# Patient Record
Sex: MALE | Race: WHITE | NOT HISPANIC OR LATINO
[De-identification: names, ages, dates, MRNs, and addresses within clinical notes are randomized per-mention and may not be internally consistent; named-entity substitution may affect disease eponyms.]

---

## 2017-10-19 ENCOUNTER — APPOINTMENT (OUTPATIENT)
Dept: SURGERY | Facility: CLINIC | Age: 57
End: 2017-10-19
Payer: COMMERCIAL

## 2017-10-19 VITALS
OXYGEN SATURATION: 95 % | WEIGHT: 220 LBS | SYSTOLIC BLOOD PRESSURE: 120 MMHG | DIASTOLIC BLOOD PRESSURE: 85 MMHG | HEART RATE: 78 BPM | HEIGHT: 74 IN | BODY MASS INDEX: 28.23 KG/M2

## 2017-10-19 DIAGNOSIS — Z87.2 PERSONAL HISTORY OF DISEASES OF THE SKIN AND SUBCUTANEOUS TISSUE: ICD-10-CM

## 2017-10-19 DIAGNOSIS — Z85.820 PERSONAL HISTORY OF MALIGNANT MELANOMA OF SKIN: ICD-10-CM

## 2017-10-19 DIAGNOSIS — Z82.49 FAMILY HISTORY OF ISCHEMIC HEART DISEASE AND OTHER DISEASES OF THE CIRCULATORY SYSTEM: ICD-10-CM

## 2017-10-19 PROBLEM — Z00.00 ENCOUNTER FOR PREVENTIVE HEALTH EXAMINATION: Status: ACTIVE | Noted: 2017-10-19

## 2017-10-19 PROCEDURE — 99242 OFF/OP CONSLTJ NEW/EST SF 20: CPT

## 2017-10-30 ENCOUNTER — OUTPATIENT (OUTPATIENT)
Dept: OUTPATIENT SERVICES | Facility: HOSPITAL | Age: 57
LOS: 1 days | Discharge: HOME | End: 2017-10-30

## 2017-10-30 DIAGNOSIS — Z01.818 ENCOUNTER FOR OTHER PREPROCEDURAL EXAMINATION: ICD-10-CM

## 2017-10-30 DIAGNOSIS — D48.1 NEOPLASM OF UNCERTAIN BEHAVIOR OF CONNECTIVE AND OTHER SOFT TISSUE: ICD-10-CM

## 2017-11-13 ENCOUNTER — OUTPATIENT (OUTPATIENT)
Dept: OUTPATIENT SERVICES | Facility: HOSPITAL | Age: 57
LOS: 1 days | Discharge: HOME | End: 2017-11-13

## 2017-11-16 DIAGNOSIS — E78.00 PURE HYPERCHOLESTEROLEMIA, UNSPECIFIED: ICD-10-CM

## 2017-11-16 DIAGNOSIS — D17.22 BENIGN LIPOMATOUS NEOPLASM OF SKIN AND SUBCUTANEOUS TISSUE OF LEFT ARM: ICD-10-CM

## 2017-11-16 DIAGNOSIS — R22.32 LOCALIZED SWELLING, MASS AND LUMP, LEFT UPPER LIMB: ICD-10-CM

## 2017-11-21 ENCOUNTER — APPOINTMENT (OUTPATIENT)
Dept: SURGERY | Facility: CLINIC | Age: 57
End: 2017-11-21
Payer: COMMERCIAL

## 2017-11-21 VITALS
WEIGHT: 220 LBS | SYSTOLIC BLOOD PRESSURE: 120 MMHG | HEIGHT: 74 IN | BODY MASS INDEX: 28.23 KG/M2 | DIASTOLIC BLOOD PRESSURE: 80 MMHG

## 2017-11-21 DIAGNOSIS — D48.1 NEOPLASM OF UNCERTAIN BEHAVIOR OF CONNECTIVE AND OTHER SOFT TISSUE: ICD-10-CM

## 2017-11-21 PROCEDURE — 99024 POSTOP FOLLOW-UP VISIT: CPT

## 2018-07-11 ENCOUNTER — TRANSCRIPTION ENCOUNTER (OUTPATIENT)
Age: 58
End: 2018-07-11

## 2021-09-01 ENCOUNTER — APPOINTMENT (OUTPATIENT)
Age: 61
End: 2021-09-01
Payer: COMMERCIAL

## 2021-09-01 VITALS
DIASTOLIC BLOOD PRESSURE: 80 MMHG | HEIGHT: 74 IN | SYSTOLIC BLOOD PRESSURE: 122 MMHG | BODY MASS INDEX: 29.77 KG/M2 | WEIGHT: 232 LBS | RESPIRATION RATE: 14 BRPM | HEART RATE: 84 BPM | OXYGEN SATURATION: 97 %

## 2021-09-01 PROCEDURE — 99213 OFFICE O/P EST LOW 20 MIN: CPT

## 2021-09-01 RX ORDER — SIMVASTATIN 20 MG/1
20 TABLET, FILM COATED ORAL
Refills: 0 | Status: COMPLETED | COMMUNITY
End: 2021-09-01

## 2021-09-01 RX ORDER — DICYCLOMINE HYDROCHLORIDE 10 MG/5ML
10 SOLUTION ORAL
Refills: 0 | Status: COMPLETED | COMMUNITY
End: 2021-09-01

## 2021-09-02 NOTE — PHYSICAL EXAM
[No Acute Distress] : no acute distress [III] : Mallampati Class: III [Normal Appearance] : normal appearance [No Neck Mass] : no neck mass [Normal Rate/Rhythm] : normal rate/rhythm [Normal S1, S2] : normal s1, s2 [No Murmurs] : no murmurs [No Resp Distress] : no resp distress [Clear to Auscultation Bilaterally] : clear to auscultation bilaterally [No Abnormalities] : no abnormalities [No Clubbing] : no clubbing [No Cyanosis] : no cyanosis [No Edema] : no edema

## 2021-09-02 NOTE — HISTORY OF PRESENT ILLNESS
[TextBox_4] : routine f/u-pt is compliant with autopap and deriving benefit-current machine is > 5 yrs old and is on recall

## 2022-04-04 ENCOUNTER — APPOINTMENT (OUTPATIENT)
Age: 62
End: 2022-04-04
Payer: COMMERCIAL

## 2022-04-04 VITALS
HEIGHT: 74 IN | WEIGHT: 236 LBS | BODY MASS INDEX: 30.29 KG/M2 | SYSTOLIC BLOOD PRESSURE: 122 MMHG | OXYGEN SATURATION: 92 % | DIASTOLIC BLOOD PRESSURE: 72 MMHG | RESPIRATION RATE: 14 BRPM | HEART RATE: 96 BPM

## 2022-04-04 PROCEDURE — 99212 OFFICE O/P EST SF 10 MIN: CPT

## 2022-04-04 NOTE — HISTORY OF PRESENT ILLNESS
[Follow-Up - Routine Clinic] : a routine clinic follow-up of [None] : No associated symptoms are reported [Good Compliance] : good compliance with treatment [Good Tolerance] : good tolerance of treatment [Good Symptom Control] : good symptom control [de-identified] : APAP

## 2022-04-04 NOTE — ASSESSMENT
[FreeTextEntry1] : HO severe TANISHA on APAP \par Patient has 2 machinea:\par 1.  A Anisa that runs up to 13 cm H2O and he tolerates very well\par 2.  A ResMed that runs up to 20 and can not tolerate it \par NOt sure if the ResMed is malfunctioning

## 2022-05-05 ENCOUNTER — APPOINTMENT (OUTPATIENT)
Age: 62
End: 2022-05-05
Payer: COMMERCIAL

## 2022-05-05 PROCEDURE — 99441: CPT

## 2022-05-05 NOTE — HISTORY OF PRESENT ILLNESS
[Follow-Up - Routine Clinic] : a routine clinic follow-up of [None] : No associated symptoms are reported [Good Compliance] : good compliance with treatment [Good Tolerance] : good tolerance of treatment [Good Symptom Control] : good symptom control [Home] : at home, [unfilled] , at the time of the visit. [Medical Office: ([facility])___] : at the medical office located in  [Verbal consent obtained from patient] : the patient, [unfilled] [de-identified] : APAP

## 2022-05-05 NOTE — ASSESSMENT
[FreeTextEntry1] : HO severe TANISHA on APAP \par Patient has 2 machinea:\par 1.  A Anisa that runs up to 14 cm H2O and he tolerates very well\par 2.  A ResMed that runs up to 20 and can not tolerate it \par NOt sure if the ResMed is malfunctioning

## 2023-02-23 ENCOUNTER — NON-APPOINTMENT (OUTPATIENT)
Age: 63
End: 2023-02-23

## 2023-03-18 ENCOUNTER — NON-APPOINTMENT (OUTPATIENT)
Age: 63
End: 2023-03-18

## 2023-04-02 ENCOUNTER — NON-APPOINTMENT (OUTPATIENT)
Age: 63
End: 2023-04-02

## 2023-04-03 ENCOUNTER — APPOINTMENT (OUTPATIENT)
Dept: SURGERY | Facility: CLINIC | Age: 63
End: 2023-04-03
Payer: COMMERCIAL

## 2023-04-03 VITALS
BODY MASS INDEX: 30.29 KG/M2 | HEIGHT: 74 IN | HEART RATE: 73 BPM | WEIGHT: 236 LBS | TEMPERATURE: 97 F | DIASTOLIC BLOOD PRESSURE: 82 MMHG | OXYGEN SATURATION: 98 % | SYSTOLIC BLOOD PRESSURE: 138 MMHG

## 2023-04-03 DIAGNOSIS — M62.08 SEPARATION OF MUSCLE (NONTRAUMATIC), OTHER SITE: ICD-10-CM

## 2023-04-03 PROCEDURE — 99202 OFFICE O/P NEW SF 15 MIN: CPT

## 2023-04-04 PROBLEM — M62.08 DIASTASIS RECTI: Status: ACTIVE | Noted: 2023-04-04

## 2023-04-04 NOTE — HISTORY OF PRESENT ILLNESS
[de-identified] :  the patient comes for excision of an inclusion cyst of his anterior chest; this was a small nodule that was present for at least 20 years,  and recently enlarged and became very inflamed about 1 month ago.  It opened and drained spontaneously and after a visit at a local urgent care center he was placed on oral amoxicillin and a topical antibiotic.  He saw  his dermatologist thereafter and comes now for surgical management.  It is currently much smaller and asymptomatic.  There is no history of prior trauma or infection here.

## 2023-04-04 NOTE — ASSESSMENT
[FreeTextEntry1] :   Persistent epidermal inclusion cyst of the anterior chest as noted above.  This can be excised in the office under local anesthesia, or in the ambulatory surgery suite under IV sedation.  He prefers the former and this will be scheduled promptly.  The procedure was explained and a surgical consent was obtained.  The diastases recti was discussed and does not require any surgical intervention.  All his questions were answered.

## 2023-04-04 NOTE — PHYSICAL EXAM
[Normal Thyroid] : the thyroid was normal [Normal Breath Sounds] : Normal breath sounds [Normal Heart Sounds] : normal heart sounds [Normal Rate and Rhythm] : normal rate and rhythm [Abdominal Masses] : No abdominal masses [Abdomen Tenderness] : ~T ~M No abdominal tenderness [No HSM] : no hepatosplenomegaly [de-identified] :  healthy [de-identified] :  no adenopathy [de-identified] :   Large asymptomatic midline diastases recti. [de-identified] :  1 cm firm raised intradermal nodule in the presternal region which is not fluctuant, tender, or draining.  No local cellulitis or lymphangitis.

## 2023-04-26 ENCOUNTER — LABORATORY RESULT (OUTPATIENT)
Age: 63
End: 2023-04-26

## 2023-04-26 ENCOUNTER — APPOINTMENT (OUTPATIENT)
Dept: SURGERY | Facility: CLINIC | Age: 63
End: 2023-04-26
Payer: COMMERCIAL

## 2023-04-26 VITALS
DIASTOLIC BLOOD PRESSURE: 76 MMHG | OXYGEN SATURATION: 98 % | SYSTOLIC BLOOD PRESSURE: 124 MMHG | BODY MASS INDEX: 27.46 KG/M2 | TEMPERATURE: 96.8 F | HEIGHT: 74 IN | HEART RATE: 74 BPM | WEIGHT: 214 LBS

## 2023-04-26 PROCEDURE — 12032 INTMD RPR S/A/T/EXT 2.6-7.5: CPT | Mod: 59

## 2023-04-26 PROCEDURE — 11402 EXC TR-EXT B9+MARG 1.1-2 CM: CPT

## 2023-04-26 NOTE — PROCEDURE
[FreeTextEntry1] :  the patient returns for excision of the small inclusion cyst in the presternal region of his chest.  He notes no new symptoms or changes in this area.\par \par After discussion and obtaining consent, he was positioned appropriately on the procedure table.  The area was prepped and draped in the usual sterile fashion and infiltrated with 3 to 4 cc of 1% Xylocaine with adrenaline.  A 3 cm transverse incision was made, which included an ellipse of overlying skin and the underlying cyst.  This was excised sharply going down to subcutaneous fat and did not involve the underlying fascia or muscle.  The specimen was submitted to pathology in fixative.  The wound was irrigated with saline and hemostasis was assured.  The incision was then closed in layers with interrupted inverting subcutaneous suture of 3-0 Vicryl and running subcuticular suture of 4-0 Vicryl.  Steri-Strips and a dry sterile occlusive dressing were applied.\par \par The patient tolerated the procedure well and left the office in good condition.  Full local care and activity instructions were reviewed and all his questions were answered.  He will use OTC analgesics as needed and return in 2 to 3 weeks for a postop check.

## 2023-05-15 ENCOUNTER — APPOINTMENT (OUTPATIENT)
Dept: SURGERY | Facility: CLINIC | Age: 63
End: 2023-05-15
Payer: COMMERCIAL

## 2023-05-15 VITALS
OXYGEN SATURATION: 98 % | TEMPERATURE: 97.7 F | DIASTOLIC BLOOD PRESSURE: 76 MMHG | HEIGHT: 74 IN | HEART RATE: 78 BPM | SYSTOLIC BLOOD PRESSURE: 114 MMHG | WEIGHT: 210 LBS | BODY MASS INDEX: 26.95 KG/M2

## 2023-05-15 DIAGNOSIS — L72.0 EPIDERMAL CYST: ICD-10-CM

## 2023-05-15 PROCEDURE — 99024 POSTOP FOLLOW-UP VISIT: CPT

## 2023-05-15 NOTE — ASSESSMENT
[FreeTextEntry1] :  no postoperative problems noted.  Local care and activity instructions were reviewed and all his questions were answered.  He will return here as need be.

## 2023-05-15 NOTE — PHYSICAL EXAM
[de-identified] :  small transverse incision in the presternal region is clean and dry, soft and flat, no evidence of hematoma or infection

## 2023-05-15 NOTE — HISTORY OF PRESENT ILLNESS
[de-identified] : First postoperative visit after excision of the anterior chest wall cyst.  He looks and feels well and has no complaints regarding the surgery

## 2023-05-24 ENCOUNTER — APPOINTMENT (OUTPATIENT)
Dept: SURGERY | Facility: CLINIC | Age: 63
End: 2023-05-24

## 2023-08-10 ENCOUNTER — APPOINTMENT (OUTPATIENT)
Dept: PULMONOLOGY | Facility: CLINIC | Age: 63
End: 2023-08-10
Payer: COMMERCIAL

## 2023-08-10 VITALS
DIASTOLIC BLOOD PRESSURE: 80 MMHG | SYSTOLIC BLOOD PRESSURE: 140 MMHG | HEART RATE: 66 BPM | WEIGHT: 212 LBS | OXYGEN SATURATION: 98 % | HEIGHT: 74 IN | RESPIRATION RATE: 12 BRPM | BODY MASS INDEX: 27.21 KG/M2

## 2023-08-10 PROCEDURE — 99213 OFFICE O/P EST LOW 20 MIN: CPT | Mod: 25

## 2023-08-10 PROCEDURE — 71046 X-RAY EXAM CHEST 2 VIEWS: CPT

## 2023-08-10 NOTE — HISTORY OF PRESENT ILLNESS
[Follow-Up - Routine Clinic] : a routine clinic follow-up of [None] : No associated symptoms are reported [Good Compliance] : good compliance with treatment [Good Tolerance] : good tolerance of treatment [Good Symptom Control] : good symptom control [de-identified] : APAP

## 2023-08-10 NOTE — ASSESSMENT
[FreeTextEntry1] : HO severe TANISHA on APAP  Patient has 2 machines.  A [employer] and a [employer].

## 2023-08-10 NOTE — PROCEDURE
[FreeTextEntry1] : CXR PA and Lateral  The costophrenic and cardiophrenic angles are sharp The nadeen parenchyma shows no infiltrates, consolidations, or nodules  The Mediastinum is within normal limits No pleural effusions

## 2023-10-21 ENCOUNTER — NON-APPOINTMENT (OUTPATIENT)
Age: 63
End: 2023-10-21

## 2023-11-14 ENCOUNTER — EMERGENCY (EMERGENCY)
Facility: HOSPITAL | Age: 63
LOS: 0 days | Discharge: HOME | End: 2023-11-14
Attending: EMERGENCY MEDICINE
Payer: COMMERCIAL

## 2023-11-14 VITALS
DIASTOLIC BLOOD PRESSURE: 86 MMHG | WEIGHT: 210.1 LBS | SYSTOLIC BLOOD PRESSURE: 192 MMHG | OXYGEN SATURATION: 99 % | HEART RATE: 57 BPM | TEMPERATURE: 98 F | RESPIRATION RATE: 18 BRPM

## 2023-11-14 DIAGNOSIS — R10.30 LOWER ABDOMINAL PAIN, UNSPECIFIED: ICD-10-CM

## 2023-11-14 DIAGNOSIS — M54.50 LOW BACK PAIN, UNSPECIFIED: ICD-10-CM

## 2023-11-14 DIAGNOSIS — R35.0 FREQUENCY OF MICTURITION: ICD-10-CM

## 2023-11-14 DIAGNOSIS — N20.0 CALCULUS OF KIDNEY: ICD-10-CM

## 2023-11-14 LAB
ALBUMIN SERPL ELPH-MCNC: 4.6 G/DL — SIGNIFICANT CHANGE UP (ref 3.5–5.2)
ALBUMIN SERPL ELPH-MCNC: 4.6 G/DL — SIGNIFICANT CHANGE UP (ref 3.5–5.2)
ALP SERPL-CCNC: 79 U/L — SIGNIFICANT CHANGE UP (ref 30–115)
ALP SERPL-CCNC: 79 U/L — SIGNIFICANT CHANGE UP (ref 30–115)
ALT FLD-CCNC: 24 U/L — SIGNIFICANT CHANGE UP (ref 0–41)
ALT FLD-CCNC: 24 U/L — SIGNIFICANT CHANGE UP (ref 0–41)
ANION GAP SERPL CALC-SCNC: 11 MMOL/L — SIGNIFICANT CHANGE UP (ref 7–14)
ANION GAP SERPL CALC-SCNC: 11 MMOL/L — SIGNIFICANT CHANGE UP (ref 7–14)
APPEARANCE UR: CLEAR — SIGNIFICANT CHANGE UP
APPEARANCE UR: CLEAR — SIGNIFICANT CHANGE UP
AST SERPL-CCNC: 16 U/L — SIGNIFICANT CHANGE UP (ref 0–41)
AST SERPL-CCNC: 16 U/L — SIGNIFICANT CHANGE UP (ref 0–41)
BASOPHILS # BLD AUTO: 0.03 K/UL — SIGNIFICANT CHANGE UP (ref 0–0.2)
BASOPHILS # BLD AUTO: 0.03 K/UL — SIGNIFICANT CHANGE UP (ref 0–0.2)
BASOPHILS NFR BLD AUTO: 0.3 % — SIGNIFICANT CHANGE UP (ref 0–1)
BASOPHILS NFR BLD AUTO: 0.3 % — SIGNIFICANT CHANGE UP (ref 0–1)
BILIRUB SERPL-MCNC: 0.4 MG/DL — SIGNIFICANT CHANGE UP (ref 0.2–1.2)
BILIRUB SERPL-MCNC: 0.4 MG/DL — SIGNIFICANT CHANGE UP (ref 0.2–1.2)
BILIRUB UR-MCNC: NEGATIVE — SIGNIFICANT CHANGE UP
BILIRUB UR-MCNC: NEGATIVE — SIGNIFICANT CHANGE UP
BUN SERPL-MCNC: 13 MG/DL — SIGNIFICANT CHANGE UP (ref 10–20)
BUN SERPL-MCNC: 13 MG/DL — SIGNIFICANT CHANGE UP (ref 10–20)
CALCIUM SERPL-MCNC: 10.8 MG/DL — HIGH (ref 8.4–10.5)
CALCIUM SERPL-MCNC: 10.8 MG/DL — HIGH (ref 8.4–10.5)
CHLORIDE SERPL-SCNC: 100 MMOL/L — SIGNIFICANT CHANGE UP (ref 98–110)
CHLORIDE SERPL-SCNC: 100 MMOL/L — SIGNIFICANT CHANGE UP (ref 98–110)
CO2 SERPL-SCNC: 26 MMOL/L — SIGNIFICANT CHANGE UP (ref 17–32)
CO2 SERPL-SCNC: 26 MMOL/L — SIGNIFICANT CHANGE UP (ref 17–32)
COLOR SPEC: YELLOW — SIGNIFICANT CHANGE UP
COLOR SPEC: YELLOW — SIGNIFICANT CHANGE UP
CREAT SERPL-MCNC: 0.9 MG/DL — SIGNIFICANT CHANGE UP (ref 0.7–1.5)
CREAT SERPL-MCNC: 0.9 MG/DL — SIGNIFICANT CHANGE UP (ref 0.7–1.5)
DIFF PNL FLD: ABNORMAL
DIFF PNL FLD: ABNORMAL
EGFR: 97 ML/MIN/1.73M2 — SIGNIFICANT CHANGE UP
EGFR: 97 ML/MIN/1.73M2 — SIGNIFICANT CHANGE UP
EOSINOPHIL # BLD AUTO: 0.02 K/UL — SIGNIFICANT CHANGE UP (ref 0–0.7)
EOSINOPHIL # BLD AUTO: 0.02 K/UL — SIGNIFICANT CHANGE UP (ref 0–0.7)
EOSINOPHIL NFR BLD AUTO: 0.2 % — SIGNIFICANT CHANGE UP (ref 0–8)
EOSINOPHIL NFR BLD AUTO: 0.2 % — SIGNIFICANT CHANGE UP (ref 0–8)
GLUCOSE SERPL-MCNC: 136 MG/DL — HIGH (ref 70–99)
GLUCOSE SERPL-MCNC: 136 MG/DL — HIGH (ref 70–99)
GLUCOSE UR QL: NEGATIVE MG/DL — SIGNIFICANT CHANGE UP
GLUCOSE UR QL: NEGATIVE MG/DL — SIGNIFICANT CHANGE UP
HCT VFR BLD CALC: 42.6 % — SIGNIFICANT CHANGE UP (ref 42–52)
HCT VFR BLD CALC: 42.6 % — SIGNIFICANT CHANGE UP (ref 42–52)
HGB BLD-MCNC: 14.2 G/DL — SIGNIFICANT CHANGE UP (ref 14–18)
HGB BLD-MCNC: 14.2 G/DL — SIGNIFICANT CHANGE UP (ref 14–18)
IMM GRANULOCYTES NFR BLD AUTO: 0.4 % — HIGH (ref 0.1–0.3)
IMM GRANULOCYTES NFR BLD AUTO: 0.4 % — HIGH (ref 0.1–0.3)
KETONES UR-MCNC: ABNORMAL MG/DL
KETONES UR-MCNC: ABNORMAL MG/DL
LACTATE SERPL-SCNC: 1.8 MMOL/L — SIGNIFICANT CHANGE UP (ref 0.7–2)
LACTATE SERPL-SCNC: 1.8 MMOL/L — SIGNIFICANT CHANGE UP (ref 0.7–2)
LEUKOCYTE ESTERASE UR-ACNC: NEGATIVE — SIGNIFICANT CHANGE UP
LEUKOCYTE ESTERASE UR-ACNC: NEGATIVE — SIGNIFICANT CHANGE UP
LIDOCAIN IGE QN: 39 U/L — SIGNIFICANT CHANGE UP (ref 7–60)
LIDOCAIN IGE QN: 39 U/L — SIGNIFICANT CHANGE UP (ref 7–60)
LYMPHOCYTES # BLD AUTO: 1.13 K/UL — LOW (ref 1.2–3.4)
LYMPHOCYTES # BLD AUTO: 1.13 K/UL — LOW (ref 1.2–3.4)
LYMPHOCYTES # BLD AUTO: 9.5 % — LOW (ref 20.5–51.1)
LYMPHOCYTES # BLD AUTO: 9.5 % — LOW (ref 20.5–51.1)
MCHC RBC-ENTMCNC: 30.7 PG — SIGNIFICANT CHANGE UP (ref 27–31)
MCHC RBC-ENTMCNC: 30.7 PG — SIGNIFICANT CHANGE UP (ref 27–31)
MCHC RBC-ENTMCNC: 33.3 G/DL — SIGNIFICANT CHANGE UP (ref 32–37)
MCHC RBC-ENTMCNC: 33.3 G/DL — SIGNIFICANT CHANGE UP (ref 32–37)
MCV RBC AUTO: 92.2 FL — SIGNIFICANT CHANGE UP (ref 80–94)
MCV RBC AUTO: 92.2 FL — SIGNIFICANT CHANGE UP (ref 80–94)
MONOCYTES # BLD AUTO: 0.85 K/UL — HIGH (ref 0.1–0.6)
MONOCYTES # BLD AUTO: 0.85 K/UL — HIGH (ref 0.1–0.6)
MONOCYTES NFR BLD AUTO: 7.2 % — SIGNIFICANT CHANGE UP (ref 1.7–9.3)
MONOCYTES NFR BLD AUTO: 7.2 % — SIGNIFICANT CHANGE UP (ref 1.7–9.3)
NEUTROPHILS # BLD AUTO: 9.78 K/UL — HIGH (ref 1.4–6.5)
NEUTROPHILS # BLD AUTO: 9.78 K/UL — HIGH (ref 1.4–6.5)
NEUTROPHILS NFR BLD AUTO: 82.4 % — HIGH (ref 42.2–75.2)
NEUTROPHILS NFR BLD AUTO: 82.4 % — HIGH (ref 42.2–75.2)
NITRITE UR-MCNC: NEGATIVE — SIGNIFICANT CHANGE UP
NITRITE UR-MCNC: NEGATIVE — SIGNIFICANT CHANGE UP
NRBC # BLD: 0 /100 WBCS — SIGNIFICANT CHANGE UP (ref 0–0)
NRBC # BLD: 0 /100 WBCS — SIGNIFICANT CHANGE UP (ref 0–0)
PH UR: 5.5 — SIGNIFICANT CHANGE UP (ref 5–8)
PH UR: 5.5 — SIGNIFICANT CHANGE UP (ref 5–8)
PLATELET # BLD AUTO: 249 K/UL — SIGNIFICANT CHANGE UP (ref 130–400)
PLATELET # BLD AUTO: 249 K/UL — SIGNIFICANT CHANGE UP (ref 130–400)
PMV BLD: 9.7 FL — SIGNIFICANT CHANGE UP (ref 7.4–10.4)
PMV BLD: 9.7 FL — SIGNIFICANT CHANGE UP (ref 7.4–10.4)
POTASSIUM SERPL-MCNC: 4.6 MMOL/L — SIGNIFICANT CHANGE UP (ref 3.5–5)
POTASSIUM SERPL-MCNC: 4.6 MMOL/L — SIGNIFICANT CHANGE UP (ref 3.5–5)
POTASSIUM SERPL-SCNC: 4.6 MMOL/L — SIGNIFICANT CHANGE UP (ref 3.5–5)
POTASSIUM SERPL-SCNC: 4.6 MMOL/L — SIGNIFICANT CHANGE UP (ref 3.5–5)
PROT SERPL-MCNC: 6.8 G/DL — SIGNIFICANT CHANGE UP (ref 6–8)
PROT SERPL-MCNC: 6.8 G/DL — SIGNIFICANT CHANGE UP (ref 6–8)
PROT UR-MCNC: NEGATIVE MG/DL — SIGNIFICANT CHANGE UP
PROT UR-MCNC: NEGATIVE MG/DL — SIGNIFICANT CHANGE UP
RBC # BLD: 4.62 M/UL — LOW (ref 4.7–6.1)
RBC # BLD: 4.62 M/UL — LOW (ref 4.7–6.1)
RBC # FLD: 12.7 % — SIGNIFICANT CHANGE UP (ref 11.5–14.5)
RBC # FLD: 12.7 % — SIGNIFICANT CHANGE UP (ref 11.5–14.5)
SODIUM SERPL-SCNC: 137 MMOL/L — SIGNIFICANT CHANGE UP (ref 135–146)
SODIUM SERPL-SCNC: 137 MMOL/L — SIGNIFICANT CHANGE UP (ref 135–146)
SP GR SPEC: 1.02 — SIGNIFICANT CHANGE UP (ref 1–1.03)
SP GR SPEC: 1.02 — SIGNIFICANT CHANGE UP (ref 1–1.03)
UROBILINOGEN FLD QL: 0.2 MG/DL — SIGNIFICANT CHANGE UP (ref 0.2–1)
UROBILINOGEN FLD QL: 0.2 MG/DL — SIGNIFICANT CHANGE UP (ref 0.2–1)
WBC # BLD: 11.86 K/UL — HIGH (ref 4.8–10.8)
WBC # BLD: 11.86 K/UL — HIGH (ref 4.8–10.8)
WBC # FLD AUTO: 11.86 K/UL — HIGH (ref 4.8–10.8)
WBC # FLD AUTO: 11.86 K/UL — HIGH (ref 4.8–10.8)

## 2023-11-14 PROCEDURE — 99285 EMERGENCY DEPT VISIT HI MDM: CPT | Mod: 25

## 2023-11-14 PROCEDURE — 93010 ELECTROCARDIOGRAM REPORT: CPT

## 2023-11-14 PROCEDURE — 96375 TX/PRO/DX INJ NEW DRUG ADDON: CPT

## 2023-11-14 PROCEDURE — 80053 COMPREHEN METABOLIC PANEL: CPT

## 2023-11-14 PROCEDURE — 83690 ASSAY OF LIPASE: CPT

## 2023-11-14 PROCEDURE — 74177 CT ABD & PELVIS W/CONTRAST: CPT | Mod: MA

## 2023-11-14 PROCEDURE — 99285 EMERGENCY DEPT VISIT HI MDM: CPT

## 2023-11-14 PROCEDURE — 71045 X-RAY EXAM CHEST 1 VIEW: CPT | Mod: 26

## 2023-11-14 PROCEDURE — 85025 COMPLETE CBC W/AUTO DIFF WBC: CPT

## 2023-11-14 PROCEDURE — 96374 THER/PROPH/DIAG INJ IV PUSH: CPT | Mod: XU

## 2023-11-14 PROCEDURE — 74177 CT ABD & PELVIS W/CONTRAST: CPT | Mod: 26,MA

## 2023-11-14 PROCEDURE — 81001 URINALYSIS AUTO W/SCOPE: CPT

## 2023-11-14 PROCEDURE — 36415 COLL VENOUS BLD VENIPUNCTURE: CPT

## 2023-11-14 PROCEDURE — 87086 URINE CULTURE/COLONY COUNT: CPT

## 2023-11-14 PROCEDURE — 71045 X-RAY EXAM CHEST 1 VIEW: CPT

## 2023-11-14 PROCEDURE — 93005 ELECTROCARDIOGRAM TRACING: CPT

## 2023-11-14 PROCEDURE — 83605 ASSAY OF LACTIC ACID: CPT

## 2023-11-14 RX ORDER — SODIUM CHLORIDE 9 MG/ML
1000 INJECTION INTRAMUSCULAR; INTRAVENOUS; SUBCUTANEOUS ONCE
Refills: 0 | Status: COMPLETED | OUTPATIENT
Start: 2023-11-14 | End: 2023-11-14

## 2023-11-14 RX ORDER — KETOROLAC TROMETHAMINE 30 MG/ML
1 SYRINGE (ML) INJECTION
Qty: 18 | Refills: 0
Start: 2023-11-14 | End: 2023-11-16

## 2023-11-14 RX ORDER — KETOROLAC TROMETHAMINE 30 MG/ML
15 SYRINGE (ML) INJECTION ONCE
Refills: 0 | Status: DISCONTINUED | OUTPATIENT
Start: 2023-11-14 | End: 2023-11-14

## 2023-11-14 RX ORDER — MORPHINE SULFATE 50 MG/1
4 CAPSULE, EXTENDED RELEASE ORAL ONCE
Refills: 0 | Status: DISCONTINUED | OUTPATIENT
Start: 2023-11-14 | End: 2023-11-14

## 2023-11-14 RX ORDER — ONDANSETRON 8 MG/1
4 TABLET, FILM COATED ORAL ONCE
Refills: 0 | Status: COMPLETED | OUTPATIENT
Start: 2023-11-14 | End: 2023-11-14

## 2023-11-14 RX ADMIN — MORPHINE SULFATE 4 MILLIGRAM(S): 50 CAPSULE, EXTENDED RELEASE ORAL at 03:24

## 2023-11-14 RX ADMIN — Medication 15 MILLIGRAM(S): at 05:24

## 2023-11-14 RX ADMIN — ONDANSETRON 4 MILLIGRAM(S): 8 TABLET, FILM COATED ORAL at 03:24

## 2023-11-14 RX ADMIN — SODIUM CHLORIDE 1000 MILLILITER(S): 9 INJECTION INTRAMUSCULAR; INTRAVENOUS; SUBCUTANEOUS at 03:24

## 2023-11-14 NOTE — ED PROVIDER NOTE - PATIENT PORTAL LINK FT
You can access the FollowMyHealth Patient Portal offered by [facility] by registering at the following website: [email or web address]. By joining [employer]’s FollowMyHealth portal, you will also be able to view your health information using other applications (apps) compatible with our system.

## 2023-11-14 NOTE — ED PROVIDER NOTE - PHYSICAL EXAMINATION
CONST: appears in pain  CARD: Normal S1 S2; Normal rate and rhythm  RESP: Equal BS B/L, No wheezes, rhonchi or rales. No distress  GI: Soft, non-distended, diastasis recti, TTP b/l lower abdomen, (+) R CVA tenderness  MS: Normal ROM in all extremities. No midline spinal tenderness.  SKIN: Warm, dry, no acute rashes. Good turgor  NEURO: A&Ox3, No focal deficits. Strength 5/5 with no sensory deficits. Steady gait

## 2023-11-14 NOTE — ED PROVIDER NOTE - NS ED ATTENDING STATEMENT MOD
This was a shared visit with the TESSIE. I reviewed and verified the documentation and independently performed the documented:

## 2023-11-14 NOTE — ED PROVIDER NOTE - CLINICAL SUMMARY MEDICAL DECISION MAKING FREE TEXT BOX
Pt  pw  abdominal pain  back pain and flank pain -  CT with [id number] UVJ stone, normal renal function, no uti.  pain  controlled no vomiting   Patient to be discharged from ED in well appearing condition. Any available test results were discussed with and printed  for patient .  Verbal instructions given, including instructions to return to ED immediately for any new, worsening, or concerning symptoms. Limitations of ED work up discussed.  Patient reports understanding of above with capacity and insight. Written discharge instructions additionally given, including follow-up plan.

## 2023-11-14 NOTE — ED PROVIDER NOTE - NSFOLLOWUPINSTRUCTIONS_ED_ALL_ED_FT
Our Emergency Department Referral Coordinators will be reaching out to you in the next 24-48 hours from 9:00am to 5:00pm with a follow up appointment. Please expect a phone call from the hospital in that time frame. If you do not receive a call or if you have any questions or concerns, you can reach them at   [phone]    follow up with urology for kidney stone your doctor for hepatomegaly    ArabicEncompass Health [facility] of [facility] AlabamaniaSelect Specialty Hospitalestrellita [facility] HospitalogTraditional ChineseVietnamese    Renal Colic  ImageRenal colic is pain that is caused by passing a kidney stone. The pain can be sharp and severe. It may be felt in the back, abdomen, side (flank), or groin. It can cause nausea. Renal colic can come and go.    Follow these instructions at home:  Watch your condition for any changes. The following actions may help to lessen any discomfort that you are feeling:    Take medicines only as directed by your health care provider.  Ask your health care provider if it is okay to take over-the-counter pain medicine.  Drink enough fluid to keep your urine clear or pale yellow. Drink 6–8 glasses of water each day.  Limit the amount of salt that you eat to less than 2 grams per day.  Reduce the amount of protein in your diet. Eat less meat, fish, nuts, and dairy.  Avoid foods such as spinach, rhubarb, nuts, or bran. These may make kidney stones more likely to form.    Contact a health care provider if:  You have a fever or chills.  Your urine smells bad or looks cloudy.  You have pain or burning when you pass urine.  Get help right away if:  Your flank pain or groin pain suddenly worsens.  You become confused or disoriented or you lose consciousness.  This information is not intended to replace advice given to you by your health care provider. Make sure you discuss any questions you have with your health care provider.

## 2023-11-14 NOTE — ED PROVIDER NOTE - OBJECTIVE STATEMENT
patient is a 64yo male PMH renal cyst, diastasis recti complaining of abdominal pain and low back pain. pt states lower abdominal pain began at 9pm, pt felt nauseous. pain increased and became more constant over a few hours, with 2 episodes of vomiting. abdominal pain radiates to right flank. pt also admits to increased urinary frequency. denies relieving or exacerbating factors. denies diarrhea, hematuria, burning with urination, fever, chest pain.

## 2023-11-14 NOTE — ED PROVIDER NOTE - CARE PROVIDER_API CALL
Evangelista Longoria  Urology  [address] [place] [address], [address]  [place] [address]  Phone: [phone]  Fax: [phone]  Follow Up Time:     Brian Mathew  Urology  [address] [place] [id number]  Phone: [phone]  Fax: [phone]  Follow Up Time:

## 2023-11-15 LAB
CULTURE RESULTS: NO GROWTH — SIGNIFICANT CHANGE UP
CULTURE RESULTS: NO GROWTH — SIGNIFICANT CHANGE UP
SPECIMEN SOURCE: SIGNIFICANT CHANGE UP
SPECIMEN SOURCE: SIGNIFICANT CHANGE UP

## 2023-11-16 ENCOUNTER — APPOINTMENT (OUTPATIENT)
Dept: UROLOGY | Facility: CLINIC | Age: 63
End: 2023-11-16
Payer: COMMERCIAL

## 2023-11-16 VITALS
DIASTOLIC BLOOD PRESSURE: 87 MMHG | HEART RATE: 82 BPM | BODY MASS INDEX: 27.21 KG/M2 | TEMPERATURE: 98 F | SYSTOLIC BLOOD PRESSURE: 156 MMHG | HEIGHT: 74 IN | WEIGHT: 212 LBS

## 2023-11-16 DIAGNOSIS — N50.0 ATROPHY OF TESTIS: ICD-10-CM

## 2023-11-16 DIAGNOSIS — Z78.9 OTHER SPECIFIED HEALTH STATUS: ICD-10-CM

## 2023-11-16 DIAGNOSIS — Z82.49 FAMILY HISTORY OF ISCHEMIC HEART DISEASE AND OTHER DISEASES OF THE CIRCULATORY SYSTEM: ICD-10-CM

## 2023-11-16 DIAGNOSIS — Z80.0 FAMILY HISTORY OF MALIGNANT NEOPLASM OF DIGESTIVE ORGANS: ICD-10-CM

## 2023-11-16 DIAGNOSIS — N43.3 HYDROCELE, UNSPECIFIED: ICD-10-CM

## 2023-11-16 DIAGNOSIS — N48.89 OTHER SPECIFIED DISORDERS OF PENIS: ICD-10-CM

## 2023-11-16 DIAGNOSIS — Z87.891 PERSONAL HISTORY OF NICOTINE DEPENDENCE: ICD-10-CM

## 2023-11-16 PROCEDURE — 99204 OFFICE O/P NEW MOD 45 MIN: CPT

## 2023-11-16 RX ORDER — VALACYCLOVIR 1 G/1
1 TABLET, FILM COATED ORAL
Qty: 21 | Refills: 0 | Status: COMPLETED | COMMUNITY
Start: 2023-03-19 | End: 2023-11-16

## 2023-11-16 RX ORDER — AMOXICILLIN AND CLAVULANATE POTASSIUM 875; 125 MG/1; MG/1
875-125 TABLET, COATED ORAL
Qty: 14 | Refills: 0 | Status: COMPLETED | COMMUNITY
Start: 2023-02-24 | End: 2023-11-16

## 2023-11-16 RX ORDER — MUPIROCIN 20 MG/G
2 OINTMENT TOPICAL
Qty: 22 | Refills: 0 | Status: COMPLETED | COMMUNITY
Start: 2023-02-24 | End: 2023-11-16

## 2023-12-22 ENCOUNTER — APPOINTMENT (OUTPATIENT)
Dept: UROLOGY | Facility: CLINIC | Age: 63
End: 2023-12-22
Payer: COMMERCIAL

## 2023-12-22 VITALS
SYSTOLIC BLOOD PRESSURE: 136 MMHG | BODY MASS INDEX: 27.59 KG/M2 | OXYGEN SATURATION: 96 % | DIASTOLIC BLOOD PRESSURE: 89 MMHG | HEART RATE: 72 BPM | HEIGHT: 74 IN | RESPIRATION RATE: 18 BRPM | WEIGHT: 215 LBS

## 2023-12-22 PROCEDURE — 99214 OFFICE O/P EST MOD 30 MIN: CPT

## 2024-02-07 ENCOUNTER — APPOINTMENT (OUTPATIENT)
Dept: PULMONOLOGY | Facility: CLINIC | Age: 64
End: 2024-02-07
Payer: COMMERCIAL

## 2024-02-07 DIAGNOSIS — G47.33 OBSTRUCTIVE SLEEP APNEA (ADULT) (PEDIATRIC): ICD-10-CM

## 2024-02-07 PROCEDURE — 99213 OFFICE O/P EST LOW 20 MIN: CPT

## 2024-02-07 NOTE — HISTORY OF PRESENT ILLNESS
[Follow-Up - Routine Clinic] : a routine clinic follow-up of [None] : No associated symptoms are reported [Good Compliance] : good compliance with treatment [Good Tolerance] : good tolerance of treatment [Good Symptom Control] : good symptom control [Home] : at home, [unfilled] , at the time of the visit. [Medical Office: ([facility])___] : at the medical office located in  [Verbal consent obtained from patient] : the patient, [unfilled] [de-identified] : APAP

## 2024-02-07 NOTE — ASSESSMENT
[FreeTextEntry1] : HO severe TANISHA on APAP  Patient has 2 machines.  A [employer] and a [employer].

## 2024-02-22 ENCOUNTER — APPOINTMENT (OUTPATIENT)
Dept: UROLOGY | Facility: CLINIC | Age: 64
End: 2024-02-22
Payer: COMMERCIAL

## 2024-02-22 VITALS
DIASTOLIC BLOOD PRESSURE: 86 MMHG | TEMPERATURE: 98.2 F | WEIGHT: 215 LBS | OXYGEN SATURATION: 96 % | HEIGHT: 74 IN | BODY MASS INDEX: 27.59 KG/M2 | HEART RATE: 85 BPM | SYSTOLIC BLOOD PRESSURE: 143 MMHG | RESPIRATION RATE: 16 BRPM

## 2024-02-22 PROCEDURE — G2211 COMPLEX E/M VISIT ADD ON: CPT

## 2024-02-22 PROCEDURE — 99213 OFFICE O/P EST LOW 20 MIN: CPT

## 2024-02-22 NOTE — ASSESSMENT
[FreeTextEntry1] : This [place] criteria classification equals "I" and his hormone levels are normal.  However a new issue is the PSA which came back at 3.5, free PSA was not done.  Mom retracted over time he has PSAs from as far back as January 25, 2017 equals 1.5 November 3, 2018 equals 1.5 March 8, 2021 equals 2.5/7 6.6% September 2021 equals 2.5/7.6% and then December 1, 2023, 3.52.  When I look at his CAT scan from his kidney stone attack the prostate measures about 35 g.  So his PSA density is 0.1 which is the upper limit of normal.   his PSA velocity has gone up 1 point in 2 years so that is also within normal limits however that is something that we will need to be watched this was discussed with him what I would do is get a PSA in 6 months.  As far as his erections his hormones are okay his [place] are normal so I would go with PDE 5 inhibitors.  We discussed the differences between the 2 main classes short onset, short duration; delayed onset prolonged duration   With respect to PDE 5 inhibition there are 2 main classes       The more rapid onset and shorter duration such as sildenafil      The delayed onset and longer duration such as tadalafil. They both have similar risks such as nonarterial ischemic optic neuropathy and tinnitus, they can both cause priapism.  They also have dissimilar risks with tadalafil causing muscle aches while Sildenafil can cause blue vision, headaches, nasal congestion, dyspepsia. There is no guarantee that either will work and there are significant differences in cost with sildenafil being much cheaper.   If he chooses to try sildenafil he can start with the 20 mg tablets he can take anywhere from 1 up to 5 in the time about 30 to 60 minutes before sexual activity preferably with an empty stomach.  If he is going to eat dinner it is better to take it before dinner or waiting at least an hour or more after dinner before taking the pills as sildenafil effectiveness is decreased when there is food in the stomach.  It can last about 4 to 6 hours with good response and some people even longer   If he chooses to try tadalafil he can start with anywhere from 5 up to 20 mg,  for the on-demand 10 mg is usually the starting dose if he is going to take it daily if sexual activity is going to be more frequent,  than 5 mg as a starting dose with a second dose of anywhere from 5 to 15 mg an hour before sexual activity if that is needed.    ergo there are 2 issues PSA =3.5with a  prostate 35 g a rise in PSA over time last 2 years ago   we will recheck in 6 months if it continues to rise we will look into it then   The penis is not working so there is too many classes of pills there is goes to go to work usually within 1/2-hour but prefer an empty stomach release no alcohol and last about 4 to 6 hours  The other option would include Cialis which takes an hour or longer to get into your system but it does not take any longer whether or not there is concomitant fluid or alcohol and it lasts between 18 and 36 hours   The pattern tends to be more weekend sex so if he has 1 pill that would keep him going over the weekend he would not have to take another pill that is preferable.  He also might not remember to take it before they sit down to eat and then if he takes it.  Because it does not work it would be quite disappointing.  He understands Cialis does cause more we will give him some places that tend to be more cost effective but of course more.  I am going to write him for the 20 mg tablet the first time he takes it he will cut it in half as if 10 mg works white to 20.  Side effects including but not limited to none arterial ischemic optic neuropathy, tinnitus AKA ringing in the ears, muscle aches, prolonged erections AKA priapism were discussed he understands that if he runs into trouble he needs to give us a call.

## 2024-02-22 NOTE — HISTORY OF PRESENT ILLNESS
[FreeTextEntry1] : Braulio is a 63-year-old male born March 20, 1960 who was seen 11/16/223 with a HX  11 13-14 2023 went to the ER for right-sided pain and a CAT scan,  showed a small distal ureter stone just within the intramural ureter.  He passed it Wednesday morning passed it but was not given a strainer and it went right down the toilet.   It was his first stone there is no family history of stones we discussed stone prevention and made dietary recommendations.  He will go to [employer] and look at their website no one from there.  However on exam we had found some mild penile and testicular atrophy.    There were no masses they were nontender so I did not think an ultrasound was necessary but I do want hormone values as his erections are not really as good as he would like.  Given that he does not do much in the way of exercise I also want to [place] criteria classification.  That was obtained on November 21, 2023 and brought in. The hormone values were done on December 1, 2023 Hemoglobin was 14.7 with a platelet count of 229,000 Liver function tests were normal Testosterone was 369 Free testosterone was 74 Bioavailable testosterone was 193 LH was 5.6 Prolactin was 9.7 His PSA was 3.5 with a free PSA not done Sex hormone binding globulin was 24.8

## 2024-02-22 NOTE — LETTER BODY
[Dear  ___] : Dear  [unfilled], [Courtesy Letter:] : I had the pleasure of seeing your patient, [unfilled], in my office today. [Please see my note below.] : Please see my note below. [Sincerely,] : Sincerely, [FreeTextEntry2] : Maria Rivas M.D. [address] [facility] [address] PAST MEDICAL HISTORY:  No pertinent past medical history

## 2024-02-22 NOTE — HISTORY OF PRESENT ILLNESS
[FreeTextEntry1] : Braulio is a 63-year-old male born March 20, 1960 who was seen 11/16/223 with a HX  11 13-14 2023 went to the ER for right-sided pain and a CAT scan,  showed a small distal ureter stone just within the intramural ureter.  He passed it Wednesday morning passed it but was not given a strainer and it went right down the toilet.   It was his first stone there is no family history of stones we discussed stone prevention and made dietary recommendations.  He will go to [employer] and look at their website no one from there.  However on exam we had found some mild penile and testicular atrophy.    There were no masses they were nontender so I did not think an ultrasound was necessary but I do want hormone values as his erections are not really as good as he would like.  Given that he does not do much in the way of exercise I also want to [place] criteria classification.  That was obtained on November 21, 2023 and brought in. The hormone values were done on December 1, 2023 Hemoglobin was 14.7 with a platelet count of 229,000 Liver function tests were normal Testosterone was 369 Free testosterone was 74 Bioavailable testosterone was 193 LH was 5.6 Prolactin was 9.7 His PSA was 3.5 with a free PSA not done Sex hormone binding globulin was 24.8

## 2024-02-22 NOTE — LETTER BODY
[Courtesy Letter:] : I had the pleasure of seeing your patient, [unfilled], in my office today. [Dear  ___] : Dear  [unfilled], [Please see my note below.] : Please see my note below. [Sincerely,] : Sincerely, [FreeTextEntry2] : Maria Rivas M.D. [address] [facility] [address]

## 2024-02-22 NOTE — LETTER BODY
[Dear  ___] : Dear  [unfilled], [Courtesy Letter:] : I had the pleasure of seeing your patient, [unfilled], in my office today. [Please see my note below.] : Please see my note below. [Sincerely,] : Sincerely, [FreeTextEntry2] : Maria Rivas M.D. [address] [facility] [address]

## 2024-02-22 NOTE — ASSESSMENT
[FreeTextEntry1] :   I wrote him enough for about 3 months, 10 pills per script for 3 months as they have relations usually about once per week maybe 3 times a month and one refill.  He has an appointment in June after PSA and if he wants into trouble he will call

## 2024-02-22 NOTE — HISTORY OF PRESENT ILLNESS
[FreeTextEntry1] : Braulio is a 63-year-old male born March 20, 1960, who was last seen 12/22/2023 for a distal right ureteral stone that he passed and flushed November 2023.  We found mild penile and testicular atrophy but blood tests 11/21/2023 were normal.  CAT scan showed the prostate to be about 35 g, his PSA was 3.5 but given his prostate size it was acceptable level we decided was to recheck the PSA in 6 months  As far as his erections we decided to go to PDE 5 inhibitors and we discussed the differences between short acting short onset like sildenafil and delayed uptake longer function like tadalafil  Their pattern tends to be more weekends and  1 pill that worked over aweekend might be good so we decided to start him on Cialis after discussing the various risk benefits alternatives.  He comes in today telling me that half a pill worked but not great 20 mg is enough to keep them both happy and will stay with this.

## 2024-02-22 NOTE — LETTER HEADER
[FreeTextEntry3] : Kusum Cunha MD [facility] [facility], [address] [place] [address]

## 2024-02-22 NOTE — ASSESSMENT
[FreeTextEntry1] : This [place] criteria classification equals "I" and his hormone levels are normal.  However a new issue is the PSA which came back at 3.5, free PSA was not done.  Mom retracted over time he has PSAs from as far back as January 25, 2017 equals 1.5 November 3, 2018 equals 1.5 March 8, 2021 equals 2.5/7 6.6% September 2021 equals 2.5/7.6% and then December 1, 2023, 3.52.  When I look at his CAT scan from his kidney stone attack the prostate measures about 35 g.  So his PSA density is 0.1 which is the upper limit of normal.   his PSA velocity has gone up 1 point in 2 years so that is also within normal limits however that is something that we will need to be watched this was discussed with him what I would do is get a PSA in 6 months.  As far as his erections his hormones are okay his [place] are normal so I would go with PDE 5 inhibitors.  We discussed the differences between the 2 main classes short onset, short duration; delayed onset prolonged duration   With respect to PDE 5 inhibition there are 2 main classes       The more rapid onset and shorter duration such as sildenafil      The delayed onset and longer duration such as tadalafil. They both have similar risks such as nonarterial ischemic optic neuropathy and tinnitus, they can both cause priapism.  They also have dissimilar risks with tadalafil causing muscle aches while Sildenafil can cause blue vision, headaches, nasal congestion, dyspepsia. There is no guarantee that either will work and there are significant differences in cost with sildenafil being much cheaper.   If he chooses to try sildenafil he can start with the 20 mg tablets he can take anywhere from 1 up to 5 in the time about 30 to 60 minutes before sexual activity preferably with an empty stomach.  If he is going to eat dinner it is better to take it before dinner or waiting at least an hour or more after dinner before taking the pills as sildenafil effectiveness is decreased when there is food in the stomach.  It can last about 4 to 6 hours with good response and some people even longer   If he chooses to try tadalafil he can start with anywhere from 5 up to 20 mg,  for the on-demand 10 mg is usually the starting dose if he is going to take it daily if sexual activity is going to be more frequent,  than 5 mg as a starting dose with a second dose of anywhere from 5 to 15 mg an hour before sexual activity if that is needed.    ergo there are 2 issues PSA =3.5with a  prostate 35 g a rise in PSA over time last 2 years ago   we will recheck in 6 months if it continues to rise we will look into it then   The penis is not working so there is too many classes of pills there is goes to go to work usually within 1/2-hour but prefer an empty stomach release no alcohol and last about 4 to 6 hours  The other option would include Cialis which takes an hour or longer to get into your system but it does not take any longer whether or not there is concomitant fluid or alcohol and it lasts between 18 and 36 hours   The pattern tends to be more weekend sex so if he has 1 pill that would keep him going over the weekend he would not have to take another pill that is preferable.  He also might not remember to take it before they sit down to eat and then if he takes it.  Because it does not work it would be quite disappointing.  He understands Cialis does cause more we will give him some places that tend to be more cost effective but of course more.  I am going to write him for the 20 mg tablet the first time he takes it he will cut it in half as if 10 mg works white to 20.  Side effects including but not limited to none arterial ischemic optic neuropathy, tinnitus AKA ringing in the ears, muscle aches, prolonged erections AKA priapism were discussed he understands that if he runs into trouble he needs to give us a call.

## 2024-05-07 ENCOUNTER — RX RENEWAL (OUTPATIENT)
Age: 64
End: 2024-05-07

## 2024-06-24 ENCOUNTER — APPOINTMENT (OUTPATIENT)
Dept: UROLOGY | Facility: CLINIC | Age: 64
End: 2024-06-24
Payer: COMMERCIAL

## 2024-06-24 VITALS
BODY MASS INDEX: 28.88 KG/M2 | TEMPERATURE: 98 F | DIASTOLIC BLOOD PRESSURE: 87 MMHG | HEIGHT: 74 IN | HEART RATE: 72 BPM | SYSTOLIC BLOOD PRESSURE: 164 MMHG | WEIGHT: 225 LBS

## 2024-06-24 DIAGNOSIS — R97.20 ELEVATED PROSTATE, SPECIFIC ANTIGEN [PSA]: ICD-10-CM

## 2024-06-24 DIAGNOSIS — N20.1 CALCULUS OF URETER: ICD-10-CM

## 2024-06-24 DIAGNOSIS — N52.9 MALE ERECTILE DYSFUNCTION, UNSPECIFIED: ICD-10-CM

## 2024-06-24 PROCEDURE — 99213 OFFICE O/P EST LOW 20 MIN: CPT

## 2024-06-24 PROCEDURE — G2211 COMPLEX E/M VISIT ADD ON: CPT

## 2024-06-24 RX ORDER — TADALAFIL 20 MG/1
20 TABLET ORAL
Qty: 15 | Refills: 6 | Status: ACTIVE | COMMUNITY
Start: 2023-12-22 | End: 1900-01-01

## 2024-08-07 ENCOUNTER — APPOINTMENT (OUTPATIENT)
Dept: PULMONOLOGY | Facility: CLINIC | Age: 64
End: 2024-08-07

## 2024-08-07 PROCEDURE — 99213 OFFICE O/P EST LOW 20 MIN: CPT

## 2024-08-07 NOTE — ASSESSMENT
[FreeTextEntry1] : HO severe TANISHA on APAP  Patient has 2 machines.  A [employer] and a [employer].

## 2024-12-16 ENCOUNTER — APPOINTMENT (OUTPATIENT)
Dept: UROLOGY | Facility: CLINIC | Age: 64
End: 2024-12-16
Payer: COMMERCIAL

## 2024-12-16 VITALS
RESPIRATION RATE: 18 BRPM | HEIGHT: 74 IN | HEART RATE: 59 BPM | BODY MASS INDEX: 29.26 KG/M2 | WEIGHT: 228 LBS | SYSTOLIC BLOOD PRESSURE: 155 MMHG | OXYGEN SATURATION: 98 % | DIASTOLIC BLOOD PRESSURE: 90 MMHG

## 2024-12-16 DIAGNOSIS — R97.20 ELEVATED PROSTATE, SPECIFIC ANTIGEN [PSA]: ICD-10-CM

## 2024-12-16 DIAGNOSIS — N52.9 MALE ERECTILE DYSFUNCTION, UNSPECIFIED: ICD-10-CM

## 2024-12-16 PROCEDURE — 99213 OFFICE O/P EST LOW 20 MIN: CPT

## 2024-12-16 PROCEDURE — G2211 COMPLEX E/M VISIT ADD ON: CPT | Mod: NC

## 2024-12-16 RX ORDER — SILDENAFIL 100 MG/1
100 TABLET, FILM COATED ORAL
Qty: 15 | Refills: 2 | Status: ACTIVE | COMMUNITY
Start: 2024-12-16 | End: 1900-01-01

## 2025-02-06 ENCOUNTER — NON-APPOINTMENT (OUTPATIENT)
Age: 65
End: 2025-02-06

## 2025-02-06 ENCOUNTER — APPOINTMENT (OUTPATIENT)
Dept: PULMONOLOGY | Facility: CLINIC | Age: 65
End: 2025-02-06
Payer: COMMERCIAL

## 2025-02-06 DIAGNOSIS — G47.33 OBSTRUCTIVE SLEEP APNEA (ADULT) (PEDIATRIC): ICD-10-CM

## 2025-02-06 PROCEDURE — 99212 OFFICE O/P EST SF 10 MIN: CPT | Mod: 95

## 2025-07-30 ENCOUNTER — NON-APPOINTMENT (OUTPATIENT)
Age: 65
End: 2025-07-30

## 2025-09-03 ENCOUNTER — APPOINTMENT (OUTPATIENT)
Dept: PULMONOLOGY | Facility: CLINIC | Age: 65
End: 2025-09-03